# Patient Record
Sex: MALE | Race: WHITE | URBAN - METROPOLITAN AREA
[De-identification: names, ages, dates, MRNs, and addresses within clinical notes are randomized per-mention and may not be internally consistent; named-entity substitution may affect disease eponyms.]

---

## 2017-06-23 DIAGNOSIS — I48.91 ATRIAL FIBRILLATION (H): ICD-10-CM

## 2017-06-23 RX ORDER — DILTIAZEM HYDROCHLORIDE 180 MG/1
180 CAPSULE, COATED, EXTENDED RELEASE ORAL
Qty: 90 CAPSULE | Refills: 3 | Status: SHIPPED | OUTPATIENT
Start: 2017-06-23

## 2017-07-12 ENCOUNTER — PRE VISIT (OUTPATIENT)
Dept: CARDIOLOGY | Facility: CLINIC | Age: 72
End: 2017-07-12

## 2017-07-12 DIAGNOSIS — I48.0 PAROXYSMAL ATRIAL FIBRILLATION (H): Primary | ICD-10-CM

## 2017-07-12 NOTE — TELEPHONE ENCOUNTER
HPI:   Mr Wallace is a pleasant is a 72-year-old male with past medical history significant for lung disease,  renal cyst, hematuria and permanent Afib presents for annual follow-up.    He was started on riverioxaban and within a day developed signif hematuriaa. He had also had problem with warfarin previously    Now CHADS-VASC =1    He is asymptomatic at rest and with exertion. Spouse is here and concurs    The patient is reluctant to start ACs due to prior experience. I pointed out that if CHADS-VAsc increases we will discuss again    He tolerates  QD    No new CV symptoms.    Has not had hematuria since AC stopped. Has not had renal cyst re-assessed for 3 years.    PAST MEDICAL HISTORY:   Past Medical History    Past Medical History    Diagnosis  Date       Atrial fibrillation        See above    CURRENT MEDICATIONS:   Current Outpatient Prescriptions    Current Outpatient Prescriptions    Medication  Sig  Dispense  Refill       metoprolol (TOPROL-XL) 50 MG 24 hr tablet  Take 1 tablet (50 mg) by mouth daily  90 tablet  3       diltiazem (CARDIZEM CD) 180 MG 24 hr CD capsule  Take 1 capsule (180 mg) by mouth daily (with breakfast)  90 capsule  3       ASPIRIN PO  Take 325 mg by mouth daily         VITAMIN E NATURAL PO          fish oil-omega-3 fatty acids (FISH OIL) 1000 MG capsule  Take 1 g by mouth daily.           PAST SURGICAL HISTORY:   Past Surgical History    No past surgical history on file.     ALLERGIES:  No Known Allergies    FAMILY HISTORY:  N/a    SOCIAL HISTORY:  History    Substance Use Topics       Smoking status:  Never Smoker        Smokeless tobacco:  Not on file       Alcohol Use:  Not on file      ROS:   Constitutional: No fever, chills, or sweats. Weight stable.   ENT: No visual disturbance, ear ache, epistaxis, sore throat.   Cardiovascular: As per HPI.   Respiratory: No cough, hemoptysis.   GI: No nausea, vomiting, hematemesis, melena, or hematochezia.   : No hematuria.    Integument: Negative.   Psychiatric: Negative.     Exam:  There were no vitals taken for this visit.  GENERAL APPEARANCE: healthy, alert and no distress  HEENT: no icterus  NECK: no adenopathy. JVP not elevated  RESPIRATORY: lungs clear to auscultation - no rales, rhonchi or wheezes.  CARDIOVASCULAR: irregular rhythm, normal S1.  ABDOMEN: soft, non tender.  EXTREMITIES: peripheral pulses normal, no edema, no bruits    Labs:  CBC RESULTS:   Lab Results    Component  Value  Date     WBC  8.7  05/30/2013     RBC  4.65  05/30/2013     HGB  15.0  05/30/2013     HCT  42.6  05/30/2013     MCV  92  05/30/2013     MCH  32.3  05/30/2013     MCHC  35.2  05/30/2013     RDW  13.0  05/30/2013     PLT  224  05/30/2013        BMP RESULTS:  Lab Results    Component  Value  Date     NA  137  05/30/2013     POTASSIUM  3.9  05/30/2013     CHLORIDE  103  05/30/2013     CO2  30  05/30/2013     ANIONGAP  4*  05/30/2013     GLC  112*  05/30/2013     BUN  25  05/30/2013     CR  1.03  05/30/2013     GFRESTIMATED  71  05/30/2013     GFRESTBLACK  86  05/30/2013     TRICIA  9.0  05/30/2013         Echocardiogram: 5/3/2012  Interpretation Summary  1.  Normal LV systolic function.  Low deceleration time of restrictive   physiology  2.  Normal RV size and function  3. No significant valvular   abnormalities  PatientHeight: 70 in  PatientWeight: 190 lbs  SystolicPressure: 119 mmHg  DiastolicPressure: 70 mmHg      Assessment and Plan:  1. Atrial fibrillation: Permanent with well controlled VR and asymptomatic  2. No AC juan david reasons indicated  3. Renal cyst shoul;d be reassessed [place]in [place]  4. Echo to be arranged in [place]  5. Continue current meds  6 RTC 1 year      The note above summarizes my findings and current recommendations.     Please do not hesitate to contact my office if you have any questions or concerns.      David Arevalo MD  Cardiac Arrhythmia Service  [facility]  [phone]

## 2017-07-13 ENCOUNTER — OFFICE VISIT (OUTPATIENT)
Dept: CARDIOLOGY | Facility: CLINIC | Age: 72
End: 2017-07-13
Attending: INTERNAL MEDICINE
Payer: MEDICARE

## 2017-07-13 VITALS
HEIGHT: 69 IN | BODY MASS INDEX: 29.46 KG/M2 | OXYGEN SATURATION: 97 % | SYSTOLIC BLOOD PRESSURE: 150 MMHG | HEART RATE: 78 BPM | DIASTOLIC BLOOD PRESSURE: 109 MMHG | WEIGHT: 198.9 LBS

## 2017-07-13 DIAGNOSIS — R06.02 SOB (SHORTNESS OF BREATH): ICD-10-CM

## 2017-07-13 DIAGNOSIS — E78.5 HYPERLIPIDEMIA LDL GOAL <100: Primary | ICD-10-CM

## 2017-07-13 DIAGNOSIS — I48.0 PAROXYSMAL ATRIAL FIBRILLATION (H): ICD-10-CM

## 2017-07-13 DIAGNOSIS — I10 ESSENTIAL HYPERTENSION: ICD-10-CM

## 2017-07-13 PROCEDURE — 93005 ELECTROCARDIOGRAM TRACING: CPT | Mod: ZF

## 2017-07-13 PROCEDURE — 99212 OFFICE O/P EST SF 10 MIN: CPT | Mod: ZF

## 2017-07-13 PROCEDURE — 93010 ELECTROCARDIOGRAM REPORT: CPT | Mod: ZP | Performed by: INTERNAL MEDICINE

## 2017-07-13 PROCEDURE — 99214 OFFICE O/P EST MOD 30 MIN: CPT | Mod: 25 | Performed by: INTERNAL MEDICINE

## 2017-07-13 RX ORDER — CARVEDILOL 12.5 MG/1
12.5 TABLET ORAL 2 TIMES DAILY WITH MEALS
Qty: 180 TABLET | Refills: 3 | Status: SHIPPED | OUTPATIENT
Start: 2017-07-13

## 2017-07-13 RX ORDER — FUROSEMIDE 20 MG
20 TABLET ORAL DAILY
Qty: 90 TABLET | Refills: 1 | Status: SHIPPED | OUTPATIENT
Start: 2017-07-13

## 2017-07-13 RX ORDER — ATORVASTATIN CALCIUM 20 MG/1
20 TABLET, FILM COATED ORAL DAILY
Qty: 90 TABLET | Refills: 3 | Status: SHIPPED | OUTPATIENT
Start: 2017-07-13

## 2017-07-13 ASSESSMENT — ENCOUNTER SYMPTOMS
SWOLLEN GLANDS: 0
BRUISES/BLEEDS EASILY: 1

## 2017-07-13 NOTE — PATIENT INSTRUCTIONS
You will be scheduled for a follow up visit in 1 year in [facility].   Instructions from today.  1. Stop metoprolol- this is a betablocker  2. Start carvedilol 12.5 mg twice daily- this is the beta blocker  3. Monitor your blood pressure 2-3 times per week and send my chart message with readings.   4. Start furosemide 20 mg once daily (water pill)  5. Schedule blood test to check your chemistry 7-10 days after you start the furosemide  6. Atorvastatin 20 mg once daily- for cholesterol  6. You will be scheduled for an exercise stress echocardiogram- do not take the betablocker the day prior to your test. You can take it after your test.     If you have any questions regarding to your visit please contact your care team:     Cardiology  Telephone Number    Amairani Heard [phone]   Or send a message to your provider via my chart.   For scheduling appts or procedures:    Linda Avelar     [phone] or  [phone]   For the Device Clinic (Pacemakers and ICD's)   RN's :   Radha Senior  During business hours: [phone]    After business hours:   [phone]- select option 4 and ask for job code [phone].    You can also contact us using My Chart. If you need assistance in setting this up, please contact our office or ask at your follow up visit.     If you need a medication refill please contact your pharmacy. Please allow 3 business days for your refill to be completed.     As always, Thank you for trusting us with your health care needs!

## 2017-07-13 NOTE — NURSING NOTE
Chief Complaint   Patient presents with     Follow Up For     EKG- PAF, 1 yr FU (9/8/16), was going to FU in [place], echo in [place].      Vitals and medications were reconciled.    Gudelia Padron, CMA

## 2017-07-13 NOTE — LETTER
7/13/2017      RE: Jim Griffith  [address]  [place] [address]       Dear Colleague,    Thank you for the opportunity to participate in the care of your patient, Jim Griffith, at the [facility]. Please see a copy of my visit note below.    HPI:   Mr. 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     Patient has no major complaints. Patient states his blood pressure is higher today than it has been. Today, BP is 150/109. On recheck it was 163/95. On manual recheck, it was 150/100. Patient did take his Metoprolol and Diltiazem today and reports good compliance.     In addition, patient states he feels more short of breath during exertion for the past year. Symptoms vary based on how much sleep. Patient notices this when he walks up an inclined surface. Patient states he can do around 3 flights of stairs before feeling short of breath. Overall, patient states this does not bother him significantly. Denies chest pain. In addition, patient dose report bilateral leg swelling. Patient denies orthopnea or PND. Denies LH, dizziness, or syncope. Patient did have an ECHO done at [place] around 6 months ago. ECHO showed borderline LVH around 60-65%. Diastolic Function was indeterminate. No wall motion abnormalities.     From a risk factor standpoint, patient has not smoked. Patient has not had lung disease re-assessed. Patient does drink around 1 beer and mixed drink. Patient does report high elevated cholesterol around 200.     For his atrial fibrillation, patient has not been on AC because of hematuria. He is on ASA 81mg. Patient is on Diltiazem 180mg qday and Metoprolol 50mg qday.     In addition, patient does have a kidney cyst. Patient states they have not checked the cyst for size.     PAST MEDICAL  HISTORY:   Past Medical History    Past Medical History    Diagnosis  Date       Atrial fibrillation        See above    CURRENT MEDICATIONS:   Current Outpatient Prescriptions    Current Outpatient Prescriptions    Medication  Sig  Dispense  Refill       metoprolol (TOPROL-XL) 50 MG 24 hr tablet  Take 1 tablet (50 mg) by mouth daily  90 tablet  3       diltiazem (CARDIZEM CD) 180 MG 24 hr CD capsule  Take 1 capsule (180 mg) by mouth daily (with breakfast)  90 capsule  3       ASPIRIN PO  Take 325 mg by mouth daily         VITAMIN E NATURAL PO          fish oil-omega-3 fatty acids (FISH OIL) 1000 MG capsule  Take 1 g by mouth daily.           PAST SURGICAL HISTORY:   Past Surgical History    No past surgical history on file.     ALLERGIES:  No Known Allergies    FAMILY HISTORY:  N/a    SOCIAL HISTORY:  History    Substance Use Topics       Smoking status:  Never Smoker        Smokeless tobacco:  Not on file       Alcohol Use:  Not on file      ROS:   Constitutional: No fever, chills, or sweats. Weight stable.   ENT: No visual disturbance, ear ache, epistaxis, sore throat.   Cardiovascular: As per HPI.   Respiratory: No cough, hemoptysis.   GI: No nausea, vomiting, hematemesis, melena, or hematochezia.   : No hematuria.   Integument: Negative.   Psychiatric: Negative.     Exam:  Gen: NAD   HEENT: NC/AT   CV: RRR. No JVD appreciated.   Pulm: CTAB   GI: s/nt/nd   Ext: 2+ Swelling in right leg; 1+ Swelling in the left leg   Neuro: No focal defects     Labs:  CBC RESULTS:   Lab Results    Component  Value  Date     WBC  8.7  05/30/2013     RBC  4.65  05/30/2013     HGB  15.0  05/30/2013     HCT  42.6  05/30/2013     MCV  92  05/30/2013     MCH  32.3  05/30/2013     MCHC  35.2  05/30/2013     RDW  13.0  05/30/2013     PLT  224  05/30/2013        BMP RESULTS:  Lab Results    Component  Value  Date     NA  137  05/30/2013     POTASSIUM  3.9  05/30/2013     CHLORIDE  103  05/30/2013     CO2  30  05/30/2013     ANIONGAP  4*   05/30/2013     GLC  112*  05/30/2013     BUN  25  05/30/2013     CR  1.03  05/30/2013     GFRESTIMATED  71  05/30/2013     GFRESTBLACK  86  05/30/2013     TRICIA  9.0  05/30/2013             Assessment and Plan:  Mr. Griffith is a 72 year old with a PMHx of Permanent Atrial Fibrillation, Lung Disease, Hematuria from AC who presents for follow up for atrial fibrillation. In addition, he has symptoms of shortness of breath with exertion and elevated blood pressure.     #Permanent Atrial Fibrillation  -Rate: Well Controlled on Metoprolol 50mg and Diltiazem 180mg.   -Rhythm Control: None.  -Anti-Coagulation: CHADS-VaSc is 2. Patient was on Warfarin; however, this was stopped because of eye bleeding. Patient was switched to Xarelto; however, this was stopped because of hematuria.     Plan: Given patient's elevated blood pressures, will switch Metoprolol to Coreg 12.5mg BID. Continue Diltiazem. Given history of easy bleeding, will hold off AC.     #Shortness of Breath on Exertion   -Likely multifactorial from likely HFpEF with atrial fibrillation and HTN, Lung Disease with Asbestosis, De-Conditioning.   -On examination, patient does appear to have bilateral leg swelling.   -Start Lasix 20mg daily x 7 days, then can dose as needed for weight/swelling/symptoms. Check BMP in one week to assess electrolytes.   -Exercise ECHO to assess for CAD.     #Hypertension   -Switch Metoprolol for Coreg 12.5mg BID.   -Continue Diltiazem.     #Hyperlipidemia   -Patient states that his cholesterol was in the 200's   -Given his cardiac risk factors, will start Atorvastatin 20mg.     #Renal Cyst   -Patient will need to follow up with PCP or Urology for possible imaging if indicated.     Angelica Bergeron PGY-4   Cardiology Fellow     I very much appreciated the opportunity to see and assess Mr Jim Griffith in the clinic with CV Fellow Dr Bergeron and agree with the note above    I spent approx 20 min with patient reviewing history, proposed tests and  discussing follow-up     Please do not hesitate to contact my office if you have any questions or concerns.      David Arevalo MD  Cardiac Arrhythmia Service  [facility]  [phone]

## 2017-07-13 NOTE — MR AVS SNAPSHOT
After Visit Summary   7/13/2017    Jim Griffith    MRN: [id number]           Patient Information     Date Of Birth          1945        Visit Information        Provider Department      7/13/2017 2:30 PM David Arevalo MD [facility]        Today's Diagnoses     Hyperlipidemia LDL goal <100    -  1    Paroxysmal atrial fibrillation (H)        Edema        SOB (shortness of breath)        HTN (hypertension)        Chest pain          Care Instructions    You will be scheduled for a follow up visit in 1 year in [facility].   Instructions from today.  1. Stop metoprolol- this is a betablocker  2. Start carvedilol 12.5 mg twice daily- this is the beta blocker  3. Monitor your blood pressure 2-3 times per week and send my chart message with readings.   4. Start furosemide 20 mg once daily (water pill)  5. Schedule blood test to check your chemistry 7-10 days after you start the furosemide  6. Atorvastatin 20 mg once daily- for cholesterol  6. You will be scheduled for an exercise stress echocardiogram- do not take the betablocker the day prior to your test. You can take it after your test.     If you have any questions regarding to your visit please contact your care team:     Cardiology  Telephone Number    Amairani Heard [phone]   Or send a message to your provider via my chart.   For scheduling appts or procedures:    Linda Avelar     [phone] or  [phone]   For the Device Clinic (Pacemakers and ICD's)   RN's :   Radha Senior  During business hours: [phone]    After business hours:   [phone]- select option 4 and ask for job code [id number].    You can also contact us using My Chart. If you need assistance in setting this up, please contact our office or ask at your follow up visit.     If you need a medication refill please contact your pharmacy. Please allow 3 business days for your refill to be completed.     As always, Thank you for trusting us  with your health care needs!          Follow-ups after your visit        Follow-up notes from your care team     Return in about 1 year (around 7/13/2018) for Dr Arevalo.      Your next 10 appointments already scheduled     Jul 27, 2017 10:30 AM CDT   Lab with  LAB    Health Lab ([facility])    [address] [place] [address]  [facility] [id number] [phone]            Jul 27, 2017 11:00 AM CDT   Ech Stress Test with SIOBHAN  STRESS ROOM    Health Echo ([facility])    [address] [place] [id number] [phone]           1.  Please bring or wear a comfortable two-piece outfit and walking shoes. 2.  Stop eating 3 hours before the test. You may drink water or juice. 3.  Stop all caffeine 12 hours before the test. This includes coffee, tea, soda pop, chocolate and certain medicines (such as Anacin and Excederin). Also avoid decaf coffee and tea, as these contain small amounts of caffeine. 4.  No alcohol, smoking or use of other tobacco products for 12 hours before the test. 5.  Refer to your provider instructions to see if you need to stop any medications (such as beta-blockers or nitrates) for this test. 6.  For patients with diabetes: -   If you take insulin, call your diabetes care team. Ask if you should take a   dose the morning of your test. -   If you take diabetes medicine by mouth, don't take it on the morning of your test. Bring it with you to take after the test.  (If you have questions, call your diabetes care team) 7.  When you arrive, please tell us if: -   You have diabetes. -   You have taken Viagra, Cialis or Levitra in the past 48 hours. 8.  For any questions that cannot be answered, please contact the ordering physician              Future tests that were ordered for you today     Open Future Orders        Priority Expected Expires Ordered    Comprehensive metabolic panel Routine  7/13/2018 7/13/2017     "Exercise Stress Echocardiogram Routine  7/13/2018 7/13/2017            Who to contact     If you have questions or need follow up information about today's clinic visit or your schedule please contact [facility] directly at [phone].  Normal or non-critical lab and imaging results will be communicated to you by Alchimerhart, letter or phone within 4 business days after the clinic has received the results. If you do not hear from us within 7 days, please contact the clinic through Alchimerhart or phone. If you have a critical or abnormal lab result, we will notify you by phone as soon as possible.  Submit refill requests through Health[facility] or call your pharmacy and they will forward the refill request to us. Please allow 3 business days for your refill to be completed.          Additional Information About Your Visit        Health: [facility] Information     [facility] gives you secure access to your electronic health record. If you see a primary care provider, you can also send messages to your care team and make appointments. If you have questions, please call your primary care clinic.  If you do not have a primary care provider, please call [phone] and they will assist you.        Care EveryWhere ID     This is your Care EveryWhere ID. This could be used by other organizations to access your [place] medical records  [id number]        Your Vitals Were     Pulse Height Pulse Oximetry BMI (Body Mass Index)          78 1.74 m (5' 8.5\") 97% 29.8 kg/m2         Blood Pressure from Last 3 Encounters:   07/13/17 (!) 150/109   09/08/16 (!) 146/94   07/02/15 138/84    Weight from Last 3 Encounters:   07/13/17 90.2 kg (198 lb 14.4 oz)   09/08/16 86.2 kg (190 lb)   07/02/15 87 kg (191 lb 11.2 oz)              We Performed the Following     EKG 12-lead, tracing only (Future)          Today's Medication Changes          These changes are accurate as of: 7/13/17  3:59 PM.  If you have any questions, ask your nurse or doctor.          "      Start taking these medicines.        Dose/Directions    atorvastatin 20 MG tablet   Commonly known as:  LIPITOR   Used for:  Hyperlipidemia LDL goal <100, Paroxysmal atrial fibrillation (H), Edema, SOB (shortness of breath), HTN (hypertension), Chest pain        Dose:  20 mg   Take 1 tablet (20 mg) by mouth daily   Quantity:  90 tablet   Refills:  3       carvedilol 12.5 MG tablet   Commonly known as:  COREG   Used for:  Paroxysmal atrial fibrillation (H), Hyperlipidemia LDL goal <100, Edema, SOB (shortness of breath), HTN (hypertension), Chest pain        Dose:  12.5 mg   Take 1 tablet (12.5 mg) by mouth 2 times daily (with meals)   Quantity:  180 tablet   Refills:  3       furosemide 20 MG tablet   Commonly known as:  LASIX   Used for:  Paroxysmal atrial fibrillation (H), Edema, SOB (shortness of breath), Hyperlipidemia LDL goal <100, HTN (hypertension), Chest pain        Dose:  20 mg   Take 1 tablet (20 mg) by mouth daily   Quantity:  90 tablet   Refills:  1         Stop taking these medicines if you haven't already. Please contact your care team if you have questions.     metoprolol 50 MG 24 hr tablet   Commonly known as:  TOPROL-XL                Where to get your medicines      These medications were sent to [id number] - [place], [place] - [address]  [address] [address]     Phone:  [phone]     atorvastatin 20 MG tablet    carvedilol 12.5 MG tablet    furosemide 20 MG tablet                Primary Care Provider Office Phone # Fax #    Edward Ang [phone]       [id number]  [facility] [address]        Equal Access to Services     [facility]: Hadii sandee ku hadasho Soomaali, waaxda luqadaha, qaybta kaalmada adeegyada, jose luis morrow . So [facility] [phone].    ATENCIÓN: Si habla español, tiene a meek disposición servicios gratuitos de asistencia lingüística. Llame al [phone].    We comply with applicable federal civil rights  laws and [place] laws. We do not discriminate on the basis of race, color, national origin, age, disability sex, sexual orientation or gender identity.            Thank you!     Thank you for choosing [facility]  for your care. Our goal is always to provide you with excellent care. Hearing back from our patients is one way we can continue to improve our services. Please take a few minutes to complete the written survey that you may receive in the mail after your visit with us. Thank you!             Your Updated Medication List - Protect others around you: Learn how to safely use, store and throw away your medicines at www.disposemymeds.org.          This list is accurate as of: 7/13/17  3:59 PM.  Always use your most recent med list.                   Brand Name Dispense Instructions for use Diagnosis    ASPIRIN PO      Take 81 mg by mouth daily        atorvastatin 20 MG tablet    LIPITOR    90 tablet    Take 1 tablet (20 mg) by mouth daily    Hyperlipidemia LDL goal <100, Paroxysmal atrial fibrillation (H), Edema, SOB (shortness of breath), HTN (hypertension), Chest pain       carvedilol 12.5 MG tablet    COREG    180 tablet    Take 1 tablet (12.5 mg) by mouth 2 times daily (with meals)    Paroxysmal atrial fibrillation (H), Hyperlipidemia LDL goal <100, Edema, SOB (shortness of breath), HTN (hypertension), Chest pain       diltiazem 180 MG 24 hr capsule    CARDIZEM CD    90 capsule    Take 1 capsule (180 mg) by mouth daily (with breakfast)    Atrial fibrillation (H)       fish oil-omega-3 fatty acids 1000 MG capsule      Take 1 g by mouth daily.        furosemide 20 MG tablet    LASIX    90 tablet    Take 1 tablet (20 mg) by mouth daily    Paroxysmal atrial fibrillation (H), Edema, SOB (shortness of breath), Hyperlipidemia LDL goal <100, HTN (hypertension), Chest pain

## 2017-07-13 NOTE — PROGRESS NOTES
HPI:   Mr. 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     Patient has no major complaints. Patient states his blood pressure is higher today than it has been. Today, BP is 150/109. On recheck it was 163/95. On manual recheck, it was 150/100. Patient did take his Metoprolol and Diltiazem today and reports good compliance.     In addition, patient states he feels more short of breath during exertion for the past year. Symptoms vary based on how much sleep. Patient notices this when he walks up an inclined surface. Patient states he can do around 3 flights of stairs before feeling short of breath. Overall, patient states this does not bother him significantly. Denies chest pain. In addition, patient dose report bilateral leg swelling. Patient denies orthopnea or PND. Denies LH, dizziness, or syncope. Patient did have an ECHO done at [place] around 6 months ago. ECHO showed borderline LVH around 60-65%. Diastolic Function was indeterminate. No wall motion abnormalities.     From a risk factor standpoint, patient has not smoked. Patient has not had lung disease re-assessed. Patient does drink around 1 beer and mixed drink. Patient does report high elevated cholesterol around 200.     For his atrial fibrillation, patient has not been on AC because of hematuria. He is on ASA 81mg. Patient is on Diltiazem 180mg qday and Metoprolol 50mg qday.     In addition, patient does have a kidney cyst. Patient states they have not checked the cyst for size.     PAST MEDICAL HISTORY:   Past Medical History    Past Medical History    Diagnosis  Date       Atrial fibrillation        See above    CURRENT MEDICATIONS:   Current Outpatient Prescriptions    Current Outpatient Prescriptions    Medication  Sig  Dispense  Refill       metoprolol (TOPROL-XL) 50 MG 24 hr tablet  Take 1 tablet (50 mg) by  mouth daily  90 tablet  3       diltiazem (CARDIZEM CD) 180 MG 24 hr CD capsule  Take 1 capsule (180 mg) by mouth daily (with breakfast)  90 capsule  3       ASPIRIN PO  Take 325 mg by mouth daily         VITAMIN E NATURAL PO          fish oil-omega-3 fatty acids (FISH OIL) 1000 MG capsule  Take 1 g by mouth daily.           PAST SURGICAL HISTORY:   Past Surgical History    No past surgical history on file.     ALLERGIES:  No Known Allergies    FAMILY HISTORY:  N/a    SOCIAL HISTORY:  History    Substance Use Topics       Smoking status:  Never Smoker        Smokeless tobacco:  Not on file       Alcohol Use:  Not on file      ROS:   Constitutional: No fever, chills, or sweats. Weight stable.   ENT: No visual disturbance, ear ache, epistaxis, sore throat.   Cardiovascular: As per HPI.   Respiratory: No cough, hemoptysis.   GI: No nausea, vomiting, hematemesis, melena, or hematochezia.   : No hematuria.   Integument: Negative.   Psychiatric: Negative.     Exam:  Gen: NAD   HEENT: NC/AT   CV: RRR. No JVD appreciated.   Pulm: CTAB   GI: s/nt/nd   Ext: 2+ Swelling in right leg; 1+ Swelling in the left leg   Neuro: No focal defects     Labs:  CBC RESULTS:   Lab Results    Component  Value  Date     WBC  8.7  05/30/2013     RBC  4.65  05/30/2013     HGB  15.0  05/30/2013     HCT  42.6  05/30/2013     MCV  92  05/30/2013     MCH  32.3  05/30/2013     MCHC  35.2  05/30/2013     RDW  13.0  05/30/2013     PLT  224  05/30/2013        BMP RESULTS:  Lab Results    Component  Value  Date     NA  137  05/30/2013     POTASSIUM  3.9  05/30/2013     CHLORIDE  103  05/30/2013     CO2  30  05/30/2013     ANIONGAP  4*  05/30/2013     GLC  112*  05/30/2013     BUN  25  05/30/2013     CR  1.03  05/30/2013     GFRESTIMATED  71  05/30/2013     GFRESTBLACK  86  05/30/2013     TRICIA  9.0  05/30/2013             Assessment and Plan:  Mr. Griffith is a 72 year old with a PMHx of Permanent Atrial Fibrillation, Lung Disease, Hematuria from  who  presents for follow up for atrial fibrillation. In addition, he has symptoms of shortness of breath with exertion and elevated blood pressure.     #Permanent Atrial Fibrillation  -Rate: Well Controlled on Metoprolol 50mg and Diltiazem 180mg.   -Rhythm Control: None.  -Anti-Coagulation: CHADS-VaSc is 2. Patient was on Warfarin; however, this was stopped because of eye bleeding. Patient was switched to Xarelto; however, this was stopped because of hematuria.     Plan: Given patient's elevated blood pressures, will switch Metoprolol to Coreg 12.5mg BID. Continue Diltiazem. Given history of easy bleeding, will hold off AC.     #Shortness of Breath on Exertion   -Likely multifactorial from likely HFpEF with atrial fibrillation and HTN, Lung Disease with Asbestosis, De-Conditioning.   -On examination, patient does appear to have bilateral leg swelling.   -Start Lasix 20mg daily x 7 days, then can dose as needed for weight/swelling/symptoms. Check BMP in one week to assess electrolytes.   -Exercise ECHO to assess for CAD.     #Hypertension   -Switch Metoprolol for Coreg 12.5mg BID.   -Continue Diltiazem.     #Hyperlipidemia   -Patient states that his cholesterol was in the 200's   -Given his cardiac risk factors, will start Atorvastatin 20mg.     #Renal Cyst   -Patient will need to follow up with PCP or Urology for possible imaging if indicated.     Angelica Bergeron PGY-4   Cardiology Fellow     I very much appreciated the opportunity to see and assess Mr Jim Griffith in the clinic with CV Fellow Dr Bergeron and agree with the note above    I spent approx 20 min with patient reviewing history, proposed tests and discussing follow-up     Please do not hesitate to contact my office if you have any questions or concerns.      David Arevalo MD  Cardiac Arrhythmia Service  [facility]  [phone]

## 2017-07-14 LAB — INTERPRETATION ECG - MUSE: NORMAL

## 2017-07-27 ENCOUNTER — CARE COORDINATION (OUTPATIENT)
Dept: CARDIOLOGY | Facility: CLINIC | Age: 72
End: 2017-07-27

## 2017-07-27 DIAGNOSIS — R06.02 SOB (SHORTNESS OF BREATH): ICD-10-CM

## 2017-07-27 DIAGNOSIS — I48.19 PERSISTENT ATRIAL FIBRILLATION (H): Primary | ICD-10-CM

## 2017-07-27 DIAGNOSIS — I48.0 PAROXYSMAL ATRIAL FIBRILLATION (H): ICD-10-CM

## 2017-07-27 DIAGNOSIS — E78.5 HYPERLIPIDEMIA LDL GOAL <100: ICD-10-CM

## 2017-07-27 DIAGNOSIS — I10 ESSENTIAL HYPERTENSION: ICD-10-CM

## 2017-07-27 LAB
ALBUMIN SERPL-MCNC: 4 G/DL (ref 3.4–5)
ALP SERPL-CCNC: 77 U/L (ref 40–150)
ALT SERPL W P-5'-P-CCNC: 38 U/L (ref 0–70)
ANION GAP SERPL CALCULATED.3IONS-SCNC: 4 MMOL/L (ref 3–14)
AST SERPL W P-5'-P-CCNC: 31 U/L (ref 0–45)
BILIRUB SERPL-MCNC: 1.2 MG/DL (ref 0.2–1.3)
BUN SERPL-MCNC: 26 MG/DL (ref 7–30)
CALCIUM SERPL-MCNC: 8.9 MG/DL (ref 8.5–10.1)
CHLORIDE SERPL-SCNC: 106 MMOL/L (ref 94–109)
CO2 SERPL-SCNC: 29 MMOL/L (ref 20–32)
CREAT SERPL-MCNC: 0.98 MG/DL (ref 0.66–1.25)
GFR SERPL CREATININE-BSD FRML MDRD: 74 ML/MIN/1.7M2
GLUCOSE SERPL-MCNC: 108 MG/DL (ref 70–99)
POTASSIUM SERPL-SCNC: 4.5 MMOL/L (ref 3.4–5.3)
PROT SERPL-MCNC: 7.1 G/DL (ref 6.8–8.8)
SODIUM SERPL-SCNC: 138 MMOL/L (ref 133–144)

## 2017-08-07 ENCOUNTER — HOSPITAL ENCOUNTER (OUTPATIENT)
Dept: CARDIOLOGY | Facility: CLINIC | Age: 72
End: 2017-08-07
Attending: INTERNAL MEDICINE
Payer: COMMERCIAL

## 2017-08-07 ENCOUNTER — HOSPITAL ENCOUNTER (OUTPATIENT)
Dept: NUCLEAR MEDICINE | Facility: CLINIC | Age: 72
Setting detail: NUCLEAR MEDICINE
End: 2017-08-07
Attending: INTERNAL MEDICINE
Payer: MEDICARE

## 2017-08-07 ENCOUNTER — HOSPITAL ENCOUNTER (OUTPATIENT)
Dept: NUCLEAR MEDICINE | Facility: CLINIC | Age: 72
Setting detail: NUCLEAR MEDICINE
Discharge: HOME | End: 2017-08-07
Attending: INTERNAL MEDICINE | Admitting: INTERNAL MEDICINE
Payer: MEDICARE

## 2017-08-07 DIAGNOSIS — R06.02 SOB (SHORTNESS OF BREATH): ICD-10-CM

## 2017-08-07 DIAGNOSIS — I48.19 PERSISTENT ATRIAL FIBRILLATION (H): ICD-10-CM

## 2017-08-07 PROCEDURE — 78452 HT MUSCLE IMAGE SPECT MULT: CPT

## 2017-08-07 PROCEDURE — 34300033 ZZH RX 343: Performed by: INTERNAL MEDICINE

## 2017-08-07 PROCEDURE — 93016 CV STRESS TEST SUPVJ ONLY: CPT | Performed by: INTERNAL MEDICINE

## 2017-08-07 PROCEDURE — 93017 CV STRESS TEST TRACING ONLY: CPT

## 2017-08-07 PROCEDURE — 93018 CV STRESS TEST I&R ONLY: CPT | Performed by: INTERNAL MEDICINE

## 2017-08-07 PROCEDURE — A9502 TC99M TETROFOSMIN: HCPCS | Performed by: INTERNAL MEDICINE

## 2017-08-07 PROCEDURE — 78452 HT MUSCLE IMAGE SPECT MULT: CPT | Mod: 26 | Performed by: INTERNAL MEDICINE

## 2017-08-07 RX ORDER — REGADENOSON 0.08 MG/ML
0.4 INJECTION, SOLUTION INTRAVENOUS ONCE
Status: COMPLETED | OUTPATIENT
Start: 2017-08-07 | End: 2017-08-07

## 2017-08-07 RX ADMIN — REGADENOSON 0.4 MG: 0.08 INJECTION, SOLUTION INTRAVENOUS at 11:39

## 2017-08-07 RX ADMIN — TETROFOSMIN 10.6 MCI.: 1.38 INJECTION, POWDER, LYOPHILIZED, FOR SOLUTION INTRAVENOUS at 10:59

## 2017-08-07 RX ADMIN — TETROFOSMIN 34.4 MCI.: 1.38 INJECTION, POWDER, LYOPHILIZED, FOR SOLUTION INTRAVENOUS at 11:41

## 2019-09-29 ENCOUNTER — HEALTH MAINTENANCE LETTER (OUTPATIENT)
Age: 74
End: 2019-09-29

## 2020-03-15 ENCOUNTER — HEALTH MAINTENANCE LETTER (OUTPATIENT)
Age: 75
End: 2020-03-15

## 2021-01-14 ENCOUNTER — HEALTH MAINTENANCE LETTER (OUTPATIENT)
Age: 76
End: 2021-01-14

## 2021-05-09 ENCOUNTER — HEALTH MAINTENANCE LETTER (OUTPATIENT)
Age: 76
End: 2021-05-09

## 2021-10-24 ENCOUNTER — HEALTH MAINTENANCE LETTER (OUTPATIENT)
Age: 76
End: 2021-10-24

## 2022-06-05 ENCOUNTER — HEALTH MAINTENANCE LETTER (OUTPATIENT)
Age: 77
End: 2022-06-05

## 2022-10-15 ENCOUNTER — HEALTH MAINTENANCE LETTER (OUTPATIENT)
Age: 77
End: 2022-10-15

## 2023-06-11 ENCOUNTER — HEALTH MAINTENANCE LETTER (OUTPATIENT)
Age: 78
End: 2023-06-11

## (undated) RX ORDER — REGADENOSON 0.08 MG/ML
INJECTION, SOLUTION INTRAVENOUS
Status: DISPENSED
Start: 2017-08-07